# Patient Record
(demographics unavailable — no encounter records)

---

## 2020-06-18 NOTE — RO
DATE OF PROCEDURE:  06/17/2020

 

PREOPERATIVE DIAGNOSIS:  Satisfied parity

 

POSTOPERATIVE DIAGNOSIS:  Satisfied parity

 

OPERATION PROPOSED:  Operative laparoscopy, bilateral salpingectomy.

 

OPERATION PERFORMED:  Operative laparoscopy, bilateral salpingectomy.

 

SURGEON:  Dr. Rosendo Naik

 

ASSISTANT:  Dr. Cramer (For retraction, extraction and visualization, without which

the surgery could not be completed).

 

ANESTHESIA:  General plus local anesthetic for intraperitoneal procedures.

 

ESTIMATED BLOOD LOSS:  Less than 20 mL

 

DESCRIPTION OF PROCEDURE:   After adequate time out, prepped and draped in the

lithotomy position, Joseph catheter in the bladder draining clear urine,

acetaminophen suppository 1300 mg per rectum, sequentials in place.  No

antibiotics.  COVID negative.  A small subumbilical incision was made.  The

Veress needle was applied, 3.6 liters of CO2 at a flow rate of 14 to a pressure

15.  Direct entry with the 0 scope 5 mm.  Panoramic review, the upper abdomen was

normal on the right side, upper abdomen on the left side was normal, both ovaries

were appeared to be normal, both tubes to the fimbriated end were normal, the

appendix itself appeared to be normal, the uterus was small midline, and the

cul-de-sac was clear.  There was a little bit of residual blood, but this was

from her period.  Her last menstrual period (LMP) started 06/17/2020.  Two 5 mm

ports were placed on the left side.  We then elevated the right tube and using

the LigaSure we ligated off the right tube to the cornual end.  We then dropped

it into the abdomen.  We then did a similar procedure on the left side elevating

the tube.  Using the LigaSure, we excised off the left tube to the cornual end.

After which, we picked up the tubes separately and brought them through the 5 mm

port.  Both tubes were sent to pathology under separate cover.  Panoramic review

after, showed no evidence of active bleeding, both ovaries appeared to be normal

and intact.  The uterus itself was complete.  With that done, instrument and pad

count correct.  We deflated the abdomen to 4 mm pressure.  We removed the two

lateral ports.  We removed the mainstem port.  We closed up the incisional sites

with #4-0 Vicryl using Dermabond to close the skin sites.  We then went down

below and removed the uterine elevator and the Ojseph catheter.  The patient was

sent to recovery in good condition.

## 2020-08-25 NOTE — REPVR
PROCEDURE INFORMATION: 

Exam: CT Abdomen And Pelvis With Contrast 

Exam date and time: 8/25/2020 7:37 PM 

Age: 23 years old 

Clinical indication: Jaundice 



TECHNIQUE: 

Imaging protocol: Computed tomography of the abdomen and pelvis with 

intravenous contrast. 

Radiation optimization: All CT scans at this facility use at least one of these 

dose optimization techniques: automated exposure control; mA and/or kV 

adjustment per patient size (includes targeted exams where dose is matched to 

clinical indication); or iterative reconstruction. 

Contrast material: ISOVUE 370; Contrast volume: 100 ml; Contrast route: IV



COMPARISON: 

US Abdomen 8/25/2020 5:23 PM 



FINDINGS: 

Lungs: The imaged portions of the lung bases are clear. The lungs were not 

fully imaged. 

Heart: No cardiomegaly or pericardial effusion. 

Diaphragm: Intact. 



Liver: Unremarkable. No liver lesion is identified. The contour of the liver is 

smooth. No hepatomegaly is noted. The liver measures 13.1 cm in craniocaudal 

dimension at the level of the right midclavicular line. 

Gallbladder and bile ducts: No calcified gallstones are noted. No gallbladder 

wall thickening, pericholecystic fluid, or pericholecystic inflammatory changes 

are identified. No dilation of the bile ducts is noted. No calcified stones are 

seen in the common bile duct. 

Pancreas: Normal. No dilation of the main pancreatic duct is noted. There is no 

inflammatory fat stranding around the pancreas to suggest acute pancreatitis. 

Spleen: No splenic lesion is noted. The spleen is enlarged and measures 13.1 

cm. 

Adrenals: Normal. No adrenal mass is noted. 

Kidneys and ureters: There is a 2 mm calculus in a midpole calyx of the left 

kidney. No stones are noted in the right kidney or in the ureters. There is no 

hydronephrosis or hydroureter. No renal lesion is seen. There are no 

wedge-shaped areas of low attenuation in the kidneys to suggest pyelonephritis. 

There is no renal abscess or perinephric fluid collection. 

Stomach and bowel: The stomach and small bowel are unremarkable. There is no 

evidence for a bowel obstruction, diverticulosis, diverticulitis, colitis, 

perforated viscus, pneumatosis intestinalis, intussusception, or volvulus. 

Appendix: Normal. There is no evidence for appendicitis. 



Intraperitoneal space: There is a small amount of free fluid that measures 

water density in the cul-de-sac. No abscess or intraperitoneal free air is 

present. 

Retroperitoneal space: No fluid collection. No mass. 

Vasculature: The abdominal aorta is patent, normal in caliber, and there is no 

dissection. The iliac arteries, common femoral arteries, renal arteries, celiac 

artery, superior mesenteric artery, and inferior mesenteric artery are patent. 

Lymph nodes: No enlarged lymph nodes. 

Bladder: The partially distended urinary bladder is unremarkable. No stones or 

masses are seen in the bladder. 

Reproductive: The uterus is anteverted. Incidental note is made of a 1.2 cm 

corpus luteal cyst in the left ovary for which follow-up is not necessary. The 

right ovary is unremarkable. No tubo-ovarian abscess is present. 

Bones/joints: There is no fracture or dislocation. No suspicious osteolytic or 

osteoblastic lesion. 

Soft tissues: There is a tiny fat containing umbilical hernia. Incidental note 

is made of a piercing in the umbilical region. 



IMPRESSION: 

1. No intrahepatic or extrahepatic biliary ductal dilation. 

2. Splenomegaly. 

3. Nonobstructive left nephrolithiasis. 



Electronically signed by: Michael Stacy On 08/25/2020  20:14:33 PM

## 2020-08-25 NOTE — REPVR
PROCEDURE INFORMATION: 

Exam: US Abdomen, Limited; Right Upper Quadrant 

Exam date and time: 8/25/2020 5:17 PM 

Age: 23 years old 

Clinical indication: Abdominal pain; Additional info: Jaundice 



TECHNIQUE: 

Imaging protocol: US abdomen. Real time ultrasound with image documentation. 

Limited exam focused on the right upper quadrant. 



COMPARISON: 

No relevant prior studies available. 



FINDINGS: 

Liver: The echogenicity of the liver is within normal limits. No liver lesion 

is identified from the images obtained. The contour of the liver is smooth. No 

hepatomegaly is noted. 

Gallbladder: Normal. No gallstones, masses, sonographic Flores's sign, 

gallbladder wall thickening, or pericholecystic fluid. 

Common bile duct: No dilation (1 mm in diameter). No stones are identified in 

the imaged portion of the common bile duct. 

Pancreas: The visualized portion of the pancreas is unremarkable. 

Right kidney: The right kidney is normal in appearance and measures 9.1 cm in 

length. There is no renal cortical thinning. The renal cortical echogenicity is 

within normal limits. No renal lesion is seen. There is no hydronephrosis. No 

obvious stones are seen in the renal collecting system. 

Intraperitoneal space: No free fluid is seen from the images obtained. 



IMPRESSION: 

No acute findings. 



Electronically signed by: Michael Stacy On 08/25/2020  19:11:43 PM